# Patient Record
Sex: FEMALE | Race: BLACK OR AFRICAN AMERICAN | NOT HISPANIC OR LATINO | Employment: UNEMPLOYED | URBAN - METROPOLITAN AREA
[De-identification: names, ages, dates, MRNs, and addresses within clinical notes are randomized per-mention and may not be internally consistent; named-entity substitution may affect disease eponyms.]

---

## 2024-09-26 ENCOUNTER — OFFICE VISIT (OUTPATIENT)
Dept: URGENT CARE | Facility: URGENT CARE | Age: 29
End: 2024-09-26
Payer: COMMERCIAL

## 2024-09-26 VITALS
TEMPERATURE: 98.3 F | DIASTOLIC BLOOD PRESSURE: 69 MMHG | HEART RATE: 69 BPM | OXYGEN SATURATION: 99 % | SYSTOLIC BLOOD PRESSURE: 106 MMHG | WEIGHT: 212 LBS

## 2024-09-26 DIAGNOSIS — L73.2 HIDRADENITIS AXILLARIS: Primary | ICD-10-CM

## 2024-09-26 PROCEDURE — 99214 OFFICE O/P EST MOD 30 MIN: CPT | Performed by: PHYSICIAN ASSISTANT

## 2024-09-26 RX ORDER — SULFAMETHOXAZOLE/TRIMETHOPRIM 800-160 MG
1 TABLET ORAL 2 TIMES DAILY
Qty: 20 TABLET | Refills: 0 | Status: SHIPPED | OUTPATIENT
Start: 2024-09-26

## 2024-09-26 RX ORDER — CLINDAMYCIN PHOSPHATE 11.9 MG/ML
SOLUTION TOPICAL 2 TIMES DAILY
Qty: 60 ML | Refills: 0 | Status: SHIPPED | OUTPATIENT
Start: 2024-09-26

## 2024-09-26 RX ORDER — NAPROXEN 500 MG/1
500 TABLET ORAL 2 TIMES DAILY WITH MEALS
Qty: 20 TABLET | Refills: 0 | Status: SHIPPED | OUTPATIENT
Start: 2024-09-26

## 2024-09-26 NOTE — PROGRESS NOTES
"Assessment & Plan     Hidradenitis axillaris      Hidradenitis suppurativa (say \"szm-[id number]-xu-NY-tus sup-yuashish-uh-TY-vuh\") is a skin condition that causes lumps on the skin. The lumps look like pimples or boils. They usually occur in areas where skin rubs against skin, such as the armpit, the buttocks, or the inner thighs. The condition can come and go for many years.  What are the symptoms?  Red lumps that may look like pimples, acne, or boils appear on the skin and are usually painful. The lumps:  Usually occur in areas where skin rubs against skin, such as in the armpit. They can also appear under the breasts, in the groin area, on the buttocks, around the anus, and on the inner thighs.  May go away on their own in a few weeks. But they often come back in the same area.  Can become infected and break open, draining blood and pus that usually smells bad.    - clindamycin (CLEOCIN T) 1 % external solution  Dispense: 60 mL; Refill: 0  - sulfamethoxazole-trimethoprim (BACTRIM DS) 800-160 MG tablet  Dispense: 20 tablet; Refill: 0  - naproxen (NAPROSYN) 500 MG tablet  Dispense: 20 tablet; Refill: 0       No follow-ups on file.    Dereje Shah, [facility], PA-C  [facility] ARIEL Collins is a 29 year old female who presents to clinic today for the following health issues:  Chief Complaint   Patient presents with    Derm Problem     Possible abscess on both underarm. Right side abscess is draining        HPI  Review of Systems  Constitutional, HEENT, cardiovascular, pulmonary, gi and gu systems are negative, except as otherwise noted.      Objective    /69 (BP Location: Right arm, Patient Position: Chair, Cuff Size: Adult Large)   Pulse 69   Temp 98.3  F (36.8  C) (Tympanic)   Wt 96.2 kg (212 lb)   SpO2 99%   Physical Exam   GENERAL: alert and no distress  SKIN: pos for pimples and localized abscess in bilateral axillas  NEURO: Normal strength and tone, mentation intact and speech " normal  PSYCH: mentation appears normal, affect normal/bright